# Patient Record
Sex: FEMALE
[De-identification: names, ages, dates, MRNs, and addresses within clinical notes are randomized per-mention and may not be internally consistent; named-entity substitution may affect disease eponyms.]

---

## 2020-07-29 ENCOUNTER — HOSPITAL ENCOUNTER (EMERGENCY)
Dept: HOSPITAL 88 - ER | Age: 25
LOS: 1 days | Discharge: HOME | End: 2020-07-30
Payer: SELF-PAY

## 2020-07-29 VITALS — WEIGHT: 200 LBS | BODY MASS INDEX: 35.44 KG/M2 | HEIGHT: 63 IN

## 2020-07-29 DIAGNOSIS — I10: Primary | ICD-10-CM

## 2020-07-29 DIAGNOSIS — R42: ICD-10-CM

## 2020-07-29 DIAGNOSIS — R51: ICD-10-CM

## 2020-07-29 LAB
BACTERIA URNS QL MICRO: (no result) /HPF
BILIRUB UR QL: NEGATIVE
CLARITY UR: CLEAR
COLOR UR: YELLOW
DEPRECATED RBC URNS MANUAL-ACNC: (no result) /HPF (ref 0–5)
EPI CELLS URNS QL MICRO: (no result) /LPF
HCG UR QL: NEGATIVE
KETONES UR QL STRIP.AUTO: NEGATIVE
LEUKOCYTE ESTERASE UR QL STRIP.AUTO: NEGATIVE
NITRITE UR QL STRIP.AUTO: NEGATIVE
PROT UR QL STRIP.AUTO: NEGATIVE
SP GR UR STRIP: 1.01 (ref 1.01–1.02)
UROBILINOGEN UR STRIP-MCNC: 0.2 MG/DL (ref 0.2–1)
WBC #/AREA URNS HPF: (no result) /HPF (ref 0–5)

## 2020-07-29 PROCEDURE — 70450 CT HEAD/BRAIN W/O DYE: CPT

## 2020-07-29 PROCEDURE — 81001 URINALYSIS AUTO W/SCOPE: CPT

## 2020-07-29 PROCEDURE — 81025 URINE PREGNANCY TEST: CPT

## 2020-07-29 PROCEDURE — 93005 ELECTROCARDIOGRAM TRACING: CPT

## 2020-07-29 PROCEDURE — 99283 EMERGENCY DEPT VISIT LOW MDM: CPT

## 2020-07-29 NOTE — EMERGENCY DEPARTMENT NOTE
History of Present Illnes


History of Present Illness


Chief Complaint:  Hypertension


History of Present Illness


This is a 25 year old  female PRESENTS WITH C/O ELEVATED BLOOD PRESSURE, STATES 

WAS AT WORK TODAY BEGAN TO HAVE HEADACHE AND WAS LIGHT HEADED, [facility] DOCTOR DID

EKG AND CHECKED BLOOD PRESSURE AND HER BP WAS"VERY HIGH" WAS STARTED ON 

[place]. STATES HER HEART RATE WAS A LITTLE ELEVATED AS WELL, STATES CAME IN 

TONIGHT BECAUSE BP AT HOME /114..


Historian:  Patient


Arrival Mode:  Car


Onset (how long ago):  hour(s) (12)


Location:  HEAD


Quality:  HEADACHE, LIGHTHEADED


Radiation:  Reports non-radiation


Severity:  moderate


Onset quality:  sudden


Duration (how long):  hour(s) (12)


Timing of current episode:  intermittent


Progression:  waxing and waning


Chronicity:  new


Context:  Denies recent illness


Relieving factors:  none


Exacerbating factors:  none


Associated symptoms:  Reports denies other symptoms


Treatments prior to arrival:  other (STARTED ON LOSARTAN TODAY)





Past Medical/Family History


Physician Review


I have reviewed the patient's past medical and family history.  Any updates have

been documented here.





Past Medical History


Recent Fever:  No


Clinical Suspicion of Infectio:  No


New/Unexplained Change in Ment:  No


Past Medical History:  Hypertension


Past Surgical History:  None





Social History


Smoking Cessation:  Never Smoker


Alcohol Use:  None


Any Illegal Drug Use:  No


Physically hurt or threatened:  No





Family History


Other family history


HTN





Review of Systems


Review of Systems


Constitutional:  Reports no symptoms


EENTM:  Reports no symptoms


Cardiovascular:  Reports no symptoms


Respiratory:  Reports no symptoms


Gastrointestinal:  Reports no symptoms


Genitourinary:  Reports no symptoms


Musculoskeletal:  Reports no symptoms


Integumentary:  Reports no symptoms


Neurological:  Reports as per HPI


Psychological:  Reports no symptoms


Endocrine:  Reports no symptoms


Hematological/Lymphatic:  Reports no symptoms





Physical Exam


Related Data


Allergies:  


Coded Allergies:  


     No Known Allergies (Unverified , 7/29/20)


Triage Vital Signs





Vital Signs








  Date Time  Temp Pulse Resp B/P (MAP) Pulse Ox O2 Delivery O2 Flow Rate FiO2


 


7/29/20 21:50 99.5 117 18 148/106 100 Room Air  








Vital signs reviewed:  Yes





Physical Exam


CONSTITUTIONAL





Constitutional:  Present well-developed, Present well-nourished


HENT


HENT:  Present normocephalic, Present atraumatic, Present oropharynx 

clear/moist, Present nose normal


HENT L/R:  Present left ext ear normal, Present right ext ear normal


EYES





Eyes:  Reports PERRL, Reports conjunctivae normal


NECK


Neck:  Present ROM normal


PULMONARY


Pulmonary:  Present effort normal, Present breath sounds normal


CARDIOVASCULAR





Cardiovascular:  Present regular rhythm, Present heart sounds normal, Present 

capillary refill normal, Present tachycardia (104)


GASTROINTESTINAL





Abdominal:  Present soft, Present nontender, Present bowel sounds normal


GENITOURINARY





Genitourinary:  Present exam deferred


SKIN


Skin:  Present warm, Present dry


MUSCULOSKELETAL





Musculoskeletal:  Present ROM normal


NEUROLOGICAL





Neurological:  Present alert, Present oriented x 3, Present no gross motor or 

sensory deficits


PSYCHOLOGICAL


Psychological:  Present mood/affect normal, Present judgement normal





Results


Laboratory


Laboratory





Laboratory Tests








Test


 7/29/20


22:03


 


Urine Color


 Yellow


(YELLOW)


 


Urine Clarity Clear (CLEAR) 


 


Urine pH 7.5 (5 - 7) 


 


Urine Specific Gravity


 1.015


(1.010-1.025)


 


Urine Protein


 Negative


(NEGATIVE)


 


Urine Glucose (UA)


 Negative


(NEGATIVE)


 


Urine Ketones


 Negative


(NEGATIVE)


 


Urine Blood


 Negative


(NEGATIVE)


 


Urine Nitrite


 Negative


(NEGATIVE)


 


Urine Bilirubin


 Negative


(NEGATIVE)


 


Urine Urobilinogen


 0.2 mg/dL (0.2


- 1)


 


Urine Leukocyte Esterase


 Negative


(NEGATIVE)


 


Urine RBC 0-5 /HPF (0-5) 


 


Urine WBC 0-5 /HPF (0-5) 


 


Urine Epithelial Cells


 Few /LPF


(NONE)


 


Urine Bacteria


 Few /HPF


(NONE)


 


Urine Pregnancy Test


 Negative


(NEGATIVE)








Lab results reviewed:  Yes





Imaging


Imaging results reviewed:  Yes


Impressions


Procedure: [id number] CT/CT BRAIN WO


Exam Date: 07/29/20                            Exam Time: 2315








                              REPORT STATUS: Signed





EXAMINATION: Head CT 





HISTORY: Headache, hypertension.


COMPARISON: None.


TECHNIQUE: Helical  axial images of the head were obtained. Reformatted coronal


and sagittal images from the axial data.  Dose modulation, iterative


reconstruction, and/or weight based adjustment of the mA/kV was utilized to


reduce the radiation dose to as low as reasonably achievable.


Image quality: Motion/streaking artifact limits the evaluation of the skull


base and posterior cranial fossa.








FINDINGS:





     Parenchyma: 


1.  No abnormal densities.


2.  No mass or hemorrhage. No CT evidence of acute territorial vascular insult.





    


     Extra-axial spaces:No abnormal density. No extra-axial fluid collections 


     Brain volume: Normal for age. 


     Ventricles: No hydrocephalus or displacement.  


     Arteries: No density suggestive of thrombus. 


     Dural sinuses: No abnormal density. 


     Foramen magnum: No mass, Chiari malformation, or basilar invagination. 


     Sella: No obvious mass.  


     Paranasal/mastoid sinuses: Imaged portions unremarkable. 


     Skull/Scalp: No lytic or blastic lesions.  No fractures. 





IMPRESSION:





No abnormalities, particularly no intracranial bleed is seen.





Signed by: Dr. Johana Lou M.D. on 7/29/2020 11:34 PM








Dictated By: JOHANA LOU MD


Electronically Signed By: JOHANA LOU MD on 07/29/20 2334


Transcribed By: MARCELLE on 07/29/20 2334 








COPY TO:   DENZEL MOREJON MD~





Procedures


12 Lead ECG Interpretation


ECG Interpretation :  


   ECG:  ECG 1


   :  Interpreted by ED physician


   Date:  Jul 29, 2020


   Time:  21:56


   Rhythm:  sinus tachycardia


   Rate:  tachycardia


   BPM:  107


   QRS axis:  normal


   ST segments normal:  Yes


   T waves normal:  No (NONSPECIFIC CHANGES)


   Other findings:  no other findings


   Clinical Impression:  non-specific ECG





Assessment & Plan


Medical Decision Making


MDM


PT WITH ELEVATED BLOOD PRESSURE WITH HEADACHE AND LIGHT HEADED


EKG, CT BRAIN ORDERED TO EVAL FOR ARRHYTHMIA, INTRACRANIAL ABNORMALITY





Assessment & Plan


Final Impression:  


(1) HTN (hypertension)


Depart Disposition:  HOME, SELF-CARE


Last Vital Signs











  Date Time  Temp Pulse Resp B/P (MAP) Pulse Ox O2 Delivery O2 Flow Rate FiO2


 


7/29/20 21:50 99.5 117 18 148/106 100 Room Air  

















DENZEL MOREJON MD        Jul 29, 2020 22:15

## 2020-07-29 NOTE — DIAGNOSTIC IMAGING REPORT
EXAMINATION: Head CT 



HISTORY: Headache, hypertension.

COMPARISON: None.

TECHNIQUE: Helical  axial images of the head were obtained. Reformatted coronal

and sagittal images from the axial data.  Dose modulation, iterative

reconstruction, and/or weight based adjustment of the mA/kV was utilized to

reduce the radiation dose to as low as reasonably achievable.

Image quality: Motion/streaking artifact limits the evaluation of the skull

base and posterior cranial fossa.





FINDINGS:



     Parenchyma: 

1.  No abnormal densities.

2.  No mass or hemorrhage. No CT evidence of acute territorial vascular insult.



    

     Extra-axial spaces:No abnormal density. No extra-axial fluid collections 

     Brain volume: Normal for age. 

     Ventricles: No hydrocephalus or displacement.  

     Arteries: No density suggestive of thrombus. 

     Dural sinuses: No abnormal density. 

     Foramen magnum: No mass, Chiari malformation, or basilar invagination. 

     Sella: No obvious mass.  

     Paranasal/mastoid sinuses: Imaged portions unremarkable. 

     Skull/Scalp: No lytic or blastic lesions.  No fractures. 



IMPRESSION:



No abnormalities, particularly no intracranial bleed is seen.



Signed by: Dr. Johana Serrano M.D. on 7/29/2020 11:34 PM

## 2020-07-30 VITALS — DIASTOLIC BLOOD PRESSURE: 104 MMHG | SYSTOLIC BLOOD PRESSURE: 138 MMHG

## 2020-08-03 ENCOUNTER — HOSPITAL ENCOUNTER (EMERGENCY)
Dept: HOSPITAL 88 - ER | Age: 25
Discharge: HOME | End: 2020-08-03
Payer: SELF-PAY

## 2020-08-03 VITALS — HEIGHT: 63 IN | BODY MASS INDEX: 35.44 KG/M2 | WEIGHT: 200 LBS

## 2020-08-03 DIAGNOSIS — R06.02: ICD-10-CM

## 2020-08-03 DIAGNOSIS — I10: ICD-10-CM

## 2020-08-03 DIAGNOSIS — R00.2: Primary | ICD-10-CM

## 2020-08-03 PROCEDURE — 93005 ELECTROCARDIOGRAM TRACING: CPT

## 2020-08-03 PROCEDURE — 99283 EMERGENCY DEPT VISIT LOW MDM: CPT

## 2020-08-03 NOTE — XMS REPORT
Continuity of Care Document

                             Created on: 2020



CARLOS A MORALES

External Reference #: [id number]

: 1995

Sex: Female



Demographics





                          Address                   [address] [place]  [address]

 

                          Home Phone                [phone]

 

                          Preferred Language        Unknown

 

                          Marital Status            Unknown

 

                          Jew Affiliation     Unknown

 

                          Race                      Unknown

 

                          Additional Race(s)        Other



 

                          Ethnic Group              Unknown





Author





                          Author                    [facility]

t

 

                          Organization              [facility]

 

                          Address                   [address]

[place]  [address]



 

                          Phone                     Unavailable







Support





                Name            Relationship    Address         Phone

 

                    MARY ALICE ALMEIDA   ECON                [address] [place]  [address]                    Unavailable







Care Team Providers





                    Care Team Member Name Role                Phone

 

                    NO,  PCP            PCP                 Unavailable

 

                    PRABHAKAR MOREJON Attphys             Unavailable







Problems





           Condition Name Condition Details Condition Category Status     Onset 

Date Resolution

Date            Last Treatment Date Treating Clinician Comments        Source

 

       Hypertension        Problem Active                                    [facility]







Allergies, Adverse Reactions, Alerts

This patient has no known allergies or adverse reactions.



Social History





           Social Habit Start Date Stop Date  Quantity   Comments   Source

 

           Sex Assigned At Birth 1995 00:00:00 1995 00:00:00 Female 

               [facility]







Medications

This patient has no known medications.



Vital Signs





             Vital Name   Observation Time Observation Value Comments     Source

 

             Body Temperature 2020 00:28:00 98.9 [degF]               [facility]

 

             Weight       2020 21:50:00 200 [lb_av]               [facility]

 

             BMI (Body Mass Index) 2020 21:50:00 35.4 kg/m2               

 [facility]







Procedures





                Procedure       Date / Time Performed Performing Clinician [facility]

e

 

                Computed tomography of brain without radiopaque contrast 2020 00:00:00                 

[facility]







Plan of Care





             Planned Activity Planned Date Details      Comments     Source

 

             Instructions              Hypertension              [facility]







Encounters





             Start Date/Time End Date/Time Encounter Type Admission Type Attendi

[facility]   Care Department Encounter ID    Source

 

             2020 22:03:00 2020 00:37:00 Departed Emergency Room 1  

          DENZEL MOREJON         [facility] [id number]    

[facility]







Results





           Test Description Test Time  Test Comments Results    Result Comments 

Source

 

                CT BRAIN WO     2020 23:31:00                             

                                  

                                        [place] [address] [place] [address]      
Patient Name: CARLOS A MORALES                                MR #: 
[id number]                    : 1995                                   
Age/Sex: 25/F  Acct #: [id number]                              Req #: [id number]
[id number]  Adm Physician:                                                      
Ordered by: DENZEL MOREJON MD                         Report #: [id number]
        Location: ER                                      Room/Bed:             
      
________________________________________________________________________________

___________________    Procedure: [id number] CT/CT BRAIN WO  Exam Date: 20 
                           Exam Time:                                       
        REPORT STATUS: Signed    EXAMINATION: Head CT       HISTORY: Headache, 
hypertension.   COMPARISON: None.   TECHNIQUE: Helical  axial images of the head
 were obtained. Reformatted coronal   and sagittal images from the axial data.  
Dose modulation, iterative   reconstruction, and/or weight based adjustment of 
the mA/kV was utilized to   reduce the radiation dose to as low as reasonably 
achievable.   Image quality: Motion/streaking artifact limits the evaluation of 
the skull   base and posterior cranial fossa.         FINDINGS:           
Parenchyma:    1.  No abnormal densities.   2.  No mass or hemorrhage. No CT 
evidence of acute territorial vascular insult.                  Extra-axial 
spaces:No abnormal density. No extra-axial fluid collections         Brain 
volume: Normal for age.         Ventricles: No hydrocephalus or displacement.   
       Arteries: No density suggestive of thrombus.         Dural sinuses: No 
abnormal density.         Foramen magnum: No mass, Chiari malformation, or 
basilar invagination.         Sella: No obvious mass.          Paranasal/mastoid
 sinuses: Imaged portions unremarkable.         Skull/Scalp: No lytic or blastic
 lesions.  No fractures.       IMPRESSION:      No abnormalities, particularly 
no intracranial bleed is seen.      Signed by: Dr. Surya Serrano M.D. on 
2020 11:34 PM        Dictated By: SURYA SERRANO MD  Electronically Signed By:
 SURYA SERRANO MD on 20  Transcribed By: MARCELLE on 20      
 COPY TO:   DENZEL MOREJON MD                                     

 

                    Urine color determination 2020 22:03:00   

 

                                        Test Item

 

             Urine Color (test code = 5778-6) YELLOW       YELLOW               

      





[facility] [id number] 22:03:00* 



             Test Item    Value        Reference Range Interpretation Comments

 

             Urine Clarity (test code = 02490-7) CLEAR        CLEAR             

         





[facility]
2020 22:03:00* 



             Test Item    Value        Reference Range Interpretation Comments

 

             Urine Specific Gravity (test code = 5811-5) 1.015        1.010-1.02

5                





[facility] pH measurement by automated test 
[id number] 22:03:00* 



             Test Item    Value        Reference Range Interpretation Comments

 

             Urine pH (test code = 37486-4) 7.5          5-7                    

    





[facility] leukocyte esterase detection by 
[id number] 22:03:00* 



             Test Item    Value        Reference Range Interpretation Comments

 

             Urine Leukocyte Esterase (test code = 5799-2) NEGATIVE     NEGATIVE

                   





[facility] nitrite [id number] 
22:03:00* 



             Test Item    Value        Reference Range Interpretation Comments

 

             Urine Nitrite (test code = 59988-0) NEGATIVE     NEGATIVE          

         





[facility] protein measurement by test strip 
(mass/volume)2020 22:03:00* 



             Test Item    Value        Reference Range Interpretation Comments

 

             Urine Protein (test code = 5804-0) NEGATIVE     NEGATIVE           

        





[facility] glucose [id number] 
22:03:00* 



             Test Item    Value        Reference Range Interpretation Comments

 

             Urine Glucose (UA) (test code = 2349-9) NEGATIVE     NEGATIVE      

             





[facility] ketones detection by automated test
 [id number] 22:03:00* 



             Test Item    Value        Reference Range Interpretation Comments

 

             Urine Ketones (test code = 31160-1) NEGATIVE     NEGATIVE          

         





[facility] urobilinogen measurement by test 
strip (mass/volume)2020 22:03:00* 



             Test Item    Value        Reference Range Interpretation Comments

 

             Urine Urobilinogen (test code = 53181-5) 0.2          0.2-1        

              





[facility] total bilirubin measurement 
(mass/volume)2020 22:03:00* 



             Test Item    Value        Reference Range Interpretation Comments

 

             Urine Bilirubin (test code = 1978-6) NEGATIVE     NEGATIVE         

          





[facility] erythrocytes [id number] 
22:03:00* 



             Test Item    Value        Reference Range Interpretation Comments

 

             Urine Blood (test code = 89568-8) NEGATIVE     NEGATIVE            

       





[facility] urine sediment leukocyte count 
by microscopy (number/high power field)2020 22:03:00* 



             Test Item    Value        Reference Range Interpretation Comments

 

             Urine WBC (test code = 5821-4) 0-5          0-5                    

    





[facility]rythrocytes detection in urine sediment 
by light [id number] 22:03:00* 



             Test Item    Value        Reference Range Interpretation Comments

 

             Urine RBC (test code = 53731-2) 0-5          0-5                   

     





[facility]acteria detection in urine sediment by 
light [id number] 22:03:00* 



             Test Item    Value        Reference Range Interpretation Comments

 

             Urine Bacteria (test code = 36369-5) FEW          NONE             

          





[facility]ownEpithelial cells detection in urine 
sediment by light [id number] 22:03:00* 



             Test Item    Value        Reference Range Interpretation Comments

 

             Urine Epithelial Cells (test code = 65369-1) FEW          NONE     

                  





[facility]ownUrine human chorionic gonadotropin (hCG) 
[id number] 22:03:00* 



             Test Item    Value        Reference Range Interpretation Comments

 

             Urine Pregnancy Test (test code = 2106-3) NEGATIVE     NEGATIVE    

               





[facility]

## 2020-08-03 NOTE — EMERGENCY DEPARTMENT NOTE
History of Present Illnes


History of Present Illness


Chief Complaint:  COVID PUI


History of Present Illness


This is a 25 year old  female who presents for palpitations. Per ems 

patient presents to the ER due to shortness of breath. Patient was


   recently diagnosed with COVID 19 3 days ago, but states that she was 

   asymptomatic. She was pacing in her home and felt she was breathing  fast, 

   SoB, hand tingling, and palpitations.


Historian:  Patient, Paramedic/EMS


Arrival Mode:  [place]


EMS Treatment PTA:  Aspirin


Additional Treatment PTA:  n/a


 Required:  No


Onset (how long ago):  minute(s) (30)


Location:  L chest


Quality:  Dull


Radiation:  Reports non-radiation


Severity:  moderate


Onset quality:  sudden


Timing of current episode:  constant


Progression:  resolved


Chronicity:  recurrent


Relieving factors:  medication


Exacerbating factors:  none


Associated symptoms:  Reports denies other symptoms


Treatments prior to arrival:  aspirin, other





Past Medical/Family History


Physician Review


I have reviewed the patient's past medical and family history.  Any updates have

been documented here.





Past Medical History


Recent Fever:  No


Clinical Suspicion of Infectio:  No


New/Unexplained Change in Ment:  No


Past Medical History:  Hypertension


Past Surgical History:  None





Review of Systems


Review of Systems


Constitutional:  Reports no symptoms, Reports as per HPI


EENTM:  Reports no symptoms


Cardiovascular:  Reports no symptoms


Respiratory:  Reports no symptoms, Reports dyspnea


Gastrointestinal:  Reports no symptoms


Genitourinary:  Reports no symptoms


Musculoskeletal:  Reports no symptoms


Integumentary:  Reports no symptoms


Neurological:  Reports no symptoms, Reports tingling


Psychological:  Reports no symptoms


Endocrine:  Reports no symptoms


Hematological/Lymphatic:  Reports no symptoms


Review of other systems:  All other systems negative





Physical Exam


Related Data


Allergies:  


Coded Allergies:  


     No Known Allergies (Unverified , 7/29/20)


Triage Vital Signs





Vital Signs








  Date Time  Temp Pulse Resp B/P (MAP) Pulse Ox O2 Delivery O2 Flow Rate FiO2


 


8/3/20 14:02 98.7 99 18 123/90 97 Room Air  








Vital signs reviewed:  Yes





Physical Exam


CONSTITUTIONAL





Constitutional:  Present well-developed, Present well-nourished


HENT


HENT:  Present normocephalic, Present atraumatic, Present oropharynx 

clear/moist, Present nose normal


HENT L/R:  Present left ext ear normal, Present right ext ear normal


EYES





Eyes:  Reports PERRL, Reports conjunctivae normal


NECK


Neck:  Present ROM normal


PULMONARY


Pulmonary:  Present effort normal, Present breath sounds normal


CARDIOVASCULAR





Cardiovascular:  Present regular rhythm, Present heart sounds normal, Present 

capillary refill normal, Present normal rate


GASTROINTESTINAL





Abdominal:  Present soft, Present nontender, Present bowel sounds normal


GENITOURINARY





Genitourinary:  Present exam deferred


SKIN


Skin:  Present warm, Present dry


MUSCULOSKELETAL





Musculoskeletal:  Present ROM normal


NEUROLOGICAL





Neurological:  Present alert, Present oriented x 3, Present no gross motor or 

sensory deficits


PSYCHOLOGICAL


Psychological:  Present mood/affect normal, Present judgement normal





Results


Laboratory


Lab results reviewed:  Yes





Imaging


Imaging results reviewed:  Yes





Diagnostics Tests


Diagnostic test(s) reviewed:  Yes





Procedures


12 Lead ECG Interpretation


ECG Interpretation :  


   ECG:  ECG 1


   :  Interpreted by ED physician


   Date:  Aug 3, 2020


   Prior ECG tracings:  reviewed


   Rhythm:  sinus rhythm


   Rate:  normal


   QRS axis:  normal


   ST segments normal:  Yes


   T waves normal:  Yes





Assessment & Plan


Medical Decision Making


MDM


25-year-old female recently diagnosed with Corona virus presenting for shortness

of breath, palpitations, and tingling. Symptoms have largely resolved. This 

happened before. Exam shows an overall well-appearing patient in no acute 

distress, vital signs stable, within acceptable limits. Initial differential 

significant for thyroid and her maladies versus coronavirus versus anxiety 

versus arrhythmia. EKG is well-appearing, laboratory workup is largely 

unremarkable. Patient remains a symptomatic. Doubt emergent process at this 

time. I discussed results patient as well as expected disease time course and 

management. They will follow up with their primary care provider or return to 

the emergency department for new or worsening symptoms. Patient's appropriate 

for discharge.





Reassessment


Reassessment time:  16:00





Assessment & Plan


Final Impression:  


(1) Palpitations


Depart Disposition:  HOME, SELF-CARE


Last Vital Signs











  Date Time  Temp Pulse Resp B/P (MAP) Pulse Ox O2 Delivery O2 Flow Rate FiO2


 


8/3/20 14:02 98.7 99 18 123/90 97 Room Air  

















Maurilio Snow MD           Aug 3, 2020 14:14

## 2020-12-27 ENCOUNTER — HOSPITAL ENCOUNTER (EMERGENCY)
Dept: HOSPITAL 88 - ER | Age: 25
Discharge: HOME | End: 2020-12-27
Payer: SELF-PAY

## 2020-12-27 VITALS — BODY MASS INDEX: 35.44 KG/M2 | HEIGHT: 63 IN | WEIGHT: 200 LBS

## 2020-12-27 DIAGNOSIS — R20.0: ICD-10-CM

## 2020-12-27 DIAGNOSIS — F41.9: ICD-10-CM

## 2020-12-27 DIAGNOSIS — I10: ICD-10-CM

## 2020-12-27 DIAGNOSIS — R20.2: Primary | ICD-10-CM

## 2020-12-27 PROCEDURE — 70450 CT HEAD/BRAIN W/O DYE: CPT

## 2020-12-27 PROCEDURE — 99283 EMERGENCY DEPT VISIT LOW MDM: CPT
